# Patient Record
Sex: MALE | Race: BLACK OR AFRICAN AMERICAN | NOT HISPANIC OR LATINO | Employment: FULL TIME | URBAN - METROPOLITAN AREA
[De-identification: names, ages, dates, MRNs, and addresses within clinical notes are randomized per-mention and may not be internally consistent; named-entity substitution may affect disease eponyms.]

---

## 2018-09-02 ENCOUNTER — HOSPITAL ENCOUNTER (EMERGENCY)
Facility: HOSPITAL | Age: 53
Discharge: HOME | End: 2018-09-02
Attending: EMERGENCY MEDICINE
Payer: COMMERCIAL

## 2018-09-02 VITALS
DIASTOLIC BLOOD PRESSURE: 90 MMHG | RESPIRATION RATE: 16 BRPM | TEMPERATURE: 98 F | SYSTOLIC BLOOD PRESSURE: 156 MMHG | OXYGEN SATURATION: 96 % | WEIGHT: 191.13 LBS | HEART RATE: 59 BPM

## 2018-09-02 DIAGNOSIS — M25.562 LEFT KNEE PAIN: Primary | ICD-10-CM

## 2018-09-02 DIAGNOSIS — M25.562 ARTHRALGIA OF LEFT KNEE: ICD-10-CM

## 2018-09-02 DIAGNOSIS — R03.0 ELEVATED BLOOD-PRESSURE READING WITHOUT DIAGNOSIS OF HYPERTENSION: ICD-10-CM

## 2018-09-02 PROCEDURE — 99283 EMERGENCY DEPT VISIT LOW MDM: CPT | Mod: 25

## 2018-09-02 RX ORDER — NAPROXEN 500 MG/1
500 TABLET ORAL 2 TIMES DAILY WITH MEALS
Qty: 12 TABLET | Refills: 0 | Status: SHIPPED | OUTPATIENT
Start: 2018-09-02

## 2018-09-02 NOTE — ED PROVIDER NOTES
History      Chief Complaint   Patient presents with    Knee Pain     left knee popping, swelling, painful x 2 weeks; denies recent trauma       Review of patient's allergies indicates:  No Known Allergies     HPI   HPI    9/2/2018, 3:23 PM   History obtained from the patient      History of Present Illness: Rashaad Park Jr. is a 53 y.o. male patient who presents to the Emergency Department for left knee pain for 2 weeks, denies injury.   able to weight bear.  Denies fever.  Symptoms are moderate in severity.     No further complaints or concerns at this time.     Blood pressure is elevated.  Pt denies cp, sob, ha, dizziness, vision change.          PCP: [facility]       Past Medical History:  History reviewed. No pertinent past medical history.      Past Surgical History:  No past surgical history on file.        Family History:  History reviewed. No pertinent family history.        Social History:  Social History     Tobacco Use    Smoking status: Not on file   Substance and Sexual Activity    Alcohol use: Not on file    Drug use: Not on file    Sexual activity: Not on file       ROS     Review of Systems   Constitutional: Negative for chills and fever.   HENT: Negative for facial swelling and sore throat.    Eyes: Negative for pain and visual disturbance.   Respiratory: Negative for chest tightness and shortness of breath.    Cardiovascular: Negative for chest pain and palpitations.   Gastrointestinal: Negative for abdominal distention, abdominal pain and nausea.   Endocrine: Negative for cold intolerance and heat intolerance.   Genitourinary: Negative for dysuria and hematuria.   Musculoskeletal: Positive for arthralgias and joint swelling. Negative for back pain and neck stiffness.   Skin: Negative for color change, pallor and rash.   Neurological: Negative for syncope and weakness.   Hematological: Negative for adenopathy. Does not bruise/bleed easily.   All other systems reviewed and are  negative.      Physical Exam      Initial Vitals [09/02/18 1404]   BP Pulse Resp Temp SpO2   (!) 156/90 (!) 59 16 97.8 °F (36.6 °C) 96 %      MAP       --         Physical Exam  Vital signs and nursing notes reviewed.  Constitutional: Patient is in NAD. Awake and alert. Well-developed and well-nourished.  Head: Atraumatic. Normocephalic.  Eyes: PERRL. EOM intact. Conjunctivae nl. No scleral icterus.  ENT: Mucous membranes are moist. Oropharynx is clear.  Neck: Supple. No JVD. No lymphadenopathy.  No meningismus  Cardiovascular: Regular rate and rhythm. No murmurs, rubs, or gallops. Distal pulses are 2+ and symmetric.  Pulmonary/Chest: No respiratory distress. Clear to auscultation bilaterally. No wheezing, rales, or rhonchi.  Abdominal: Soft. Non-distended. No TTP. No rebound, guarding, or rigidity. Good bowel sounds.  Genitourinary: No CVA tenderness  Musculoskeletal: Moves all extremities.  Left knee with no erythema, or heat. +Mild edema. FROM.  No laxity.  Medial tenderness.   2+DP pulses.  Hip and ankle with from and nontender.  Skin: Warm and dry.  Neurological: Awake and alert. No acute focal neurological deficits are appreciated.  Psychiatric: Normal affect. Good eye contact. Appropriate in content.      ED Course          Procedures  ED Vital Signs:  Vitals:    09/02/18 1404   BP: (!) 156/90   Pulse: (!) 59   Resp: 16   Temp: 97.8 °F (36.6 °C)   TempSrc: Oral   SpO2: 96%   Weight: 86.7 kg (191 lb 2.2 oz)                 Imaging Results:  Imaging Results          X-Ray Knee Complete 4 or more Views Left (Final result)  Result time 09/02/18 15:04:33   Procedure changed from X-Ray Knee 3 View Left     Final result by Lino Pond MD (09/02/18 15:04:33)                 Impression:      Negative exam.      Electronically signed by: Lino Pond MD  Date:    09/02/2018  Time:    15:04             Narrative:    EXAMINATION:  XR KNEE COMP 4 OR MORE VIEWS LEFT    CLINICAL HISTORY:  Pain in left  kneepain;    TECHNIQUE:  Standard radiography performed.    COMPARISON:  None    FINDINGS:  Bone density and architecture are normal.  No acute findings.                                   The Emergency Provider reviewed the vital signs and test results, which are outlined above.    ED Discussion             Medication(s) given in the ER:  Medications - No data to display        Follow-up Information     Provider [facility] In 2 days.                     New Prescriptions    NAPROXEN (NAPROSYN) 500 MG TABLET    Take 1 tablet (500 mg total) by mouth 2 (two) times daily with meals. Prn pain          Medical Decision Making      Pre-hypertension/Hypertension: The pt has been informed that they may have pre-hypertension or hypertension based on a blood pressure reading in the ED. I recommend that the pt call the PCP listed on their discharge instructions or a physician of their choice this week to arrange f/u for further evaluation of possible pre-hypertension or hypertension.       All findings were reviewed with the patient/family in detail.   All remaining questions and concerns were addressed at that time.  Patient/family has been counseled regarding the need for follow-up as well as the indication to return to the emergency room should new or worrisome developments occur.        MDM               Clinical Impression:        ICD-10-CM ICD-9-CM   1. Left knee pain M25.562 719.46   2. Arthralgia of left knee M25.562 719.46   3. Elevated blood-pressure reading without diagnosis of hypertension R03.0 796.2             Emma Hendrickson PA-C  09/02/18 1525

## 2024-09-24 ENCOUNTER — TELEPHONE (OUTPATIENT)
Dept: PHYSICAL MEDICINE AND REHAB | Facility: CLINIC | Age: 59
End: 2024-09-24
Payer: MEDICARE

## 2024-09-24 ENCOUNTER — OFFICE VISIT (OUTPATIENT)
Dept: PAIN MEDICINE | Facility: CLINIC | Age: 59
End: 2024-09-24
Payer: MEDICARE

## 2024-09-24 VITALS
RESPIRATION RATE: 17 BRPM | SYSTOLIC BLOOD PRESSURE: 126 MMHG | WEIGHT: 218.25 LBS | HEART RATE: 68 BPM | HEIGHT: 73 IN | DIASTOLIC BLOOD PRESSURE: 85 MMHG | BODY MASS INDEX: 28.93 KG/M2

## 2024-09-24 DIAGNOSIS — M53.3 SACROILIAC JOINT PAIN: ICD-10-CM

## 2024-09-24 DIAGNOSIS — M54.16 LUMBAR RADICULOPATHY: Primary | ICD-10-CM

## 2024-09-24 DIAGNOSIS — Z96.642 HISTORY OF TOTAL HIP REPLACEMENT, LEFT: ICD-10-CM

## 2024-09-24 DIAGNOSIS — M54.16 LUMBAR RADICULOPATHY, CHRONIC: ICD-10-CM

## 2024-09-24 PROCEDURE — 99204 OFFICE O/P NEW MOD 45 MIN: CPT | Mod: S$GLB,,, | Performed by: ANESTHESIOLOGY

## 2024-09-24 PROCEDURE — 3008F BODY MASS INDEX DOCD: CPT | Mod: CPTII,S$GLB,, | Performed by: ANESTHESIOLOGY

## 2024-09-24 PROCEDURE — 3074F SYST BP LT 130 MM HG: CPT | Mod: CPTII,S$GLB,, | Performed by: ANESTHESIOLOGY

## 2024-09-24 PROCEDURE — 1159F MED LIST DOCD IN RCRD: CPT | Mod: CPTII,S$GLB,, | Performed by: ANESTHESIOLOGY

## 2024-09-24 PROCEDURE — 1160F RVW MEDS BY RX/DR IN RCRD: CPT | Mod: CPTII,S$GLB,, | Performed by: ANESTHESIOLOGY

## 2024-09-24 PROCEDURE — 99999 PR PBB SHADOW E&M-NEW PATIENT-LVL IV: CPT | Mod: PBBFAC,,, | Performed by: ANESTHESIOLOGY

## 2024-09-24 PROCEDURE — 3079F DIAST BP 80-89 MM HG: CPT | Mod: CPTII,S$GLB,, | Performed by: ANESTHESIOLOGY

## 2024-09-24 RX ORDER — SILDENAFIL 100 MG/1
TABLET, FILM COATED ORAL
COMMUNITY

## 2024-09-24 RX ORDER — PREGABALIN 100 MG/1
CAPSULE ORAL
Qty: 120 CAPSULE | Refills: 0 | Status: SHIPPED | OUTPATIENT
Start: 2024-09-24 | End: 2024-10-24

## 2024-09-24 RX ORDER — ZOLPIDEM TARTRATE 10 MG/1
10 TABLET ORAL NIGHTLY PRN
COMMUNITY

## 2024-09-24 RX ORDER — TIZANIDINE 4 MG/1
4 TABLET ORAL
COMMUNITY
Start: 2024-05-06

## 2024-09-24 RX ORDER — TADALAFIL 20 MG/1
20 TABLET ORAL DAILY PRN
COMMUNITY

## 2024-09-24 RX ORDER — ACETAMINOPHEN 500 MG
500 TABLET ORAL EVERY 6 HOURS PRN
COMMUNITY

## 2024-09-24 NOTE — PROGRESS NOTES
New Patient Chronic Pain Note (Initial Visit)    Referring Physician: Self, Aaareferral    PCP: No, Primary Doctor    Chief Complaint:   Chief Complaint   Patient presents with    Low-back Pain    Leg Pain     RIGHT         SUBJECTIVE:    Rashaad Park Jr. is a 59 y.o. male with past medical history significant for prostate cancer, erectile dysfunction, history of left total hip arthroplasty who presents to the clinic for the evaluation of lower back and leg pain.  Patient reports pain has been present for several years likely related to an 24 years prior when he was lifting up a heavy manhole lid and twisted his back.  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  Today he reports pain which is constant which is rated an 8/10.  Of note patient history of a left total hip arthroplasty in 2018 with Dr. Turner at [facility].  He reports pain which is constant and described as sharp in nature.  He reports pain in a bandlike distribution in the lower back which radiates into the groin and periodically down the right upper extremity in L 2-4 distribution to the knee.  Pain is exacerbated with moving from sitting to standing and ambulation or standing exceeding 5 minutes.  He does report a proximally 3-4 falls recently secondary to his pain.  He denies any left-sided symptoms.  Pain is marginally improved with massage.  Of note patient has received prior intervention with Dr. Bianchi [facility] which his family member believes were epidural steroid injections which gave him no significant relief.  Patient reports they had discussed considering neurosurgery with an anterior approach which she did not want to consider.  Patient has performed conventional land-based physical therapy in the past and has been performing physician directed physical therapy exercises for lower back and leg pain over the last 8 weeks from 07/24/2024 through 09/24/2024 with no  "significant improvement in his symptoms.  He has trialed gabapentin with significant GI discomfort.  He is currently taking Percocet 5 mg, 2 tablets with ineffective improvement in his pain.  He does report significant GI distress with most medications frequently requiring Phenergan."    Patient reports significant motor weakness.  Patient denies night fever/night sweats, urinary incontinence, bowel incontinence, significant weight loss, and loss of sensations.      Pain Disability Index Review:         9/24/2024     1:30 PM   Last 3 PDI Scores   Pain Disability Index (PDI) 49       Non-Pharmacologic Treatments:  Physical Therapy/Home Exercise: yes  Ice/Heat:yes  TENS: no  Acupuncture: no  Massage: no  Chiropractic: no    Other: no      Pain Medications:  - Adjuvant Medications: Alleve (Naproxen)    Pain Procedures:   Dr. Bianchi    History reviewed. No pertinent past medical history.  History reviewed. No pertinent surgical history.  Review of patient's allergies indicates:  No Known Allergies    Current Outpatient Medications   Medication Sig    acetaminophen (TYLENOL) 500 MG tablet Take 500 mg by mouth every 6 (six) hours as needed.    naproxen (NAPROSYN) 500 MG tablet Take 1 tablet (500 mg total) by mouth 2 (two) times daily with meals. Prn pain    sildenafiL (VIAGRA) 100 MG tablet 1 tablet as needed Orally Once a day for 30 day(s)    tadalafiL (CIALIS) 20 MG Tab Take 20 mg by mouth daily as needed.    tiZANidine (ZANAFLEX) 4 MG tablet Take 4 mg by mouth.    zolpidem (AMBIEN) 10 mg Tab Take 10 mg by mouth nightly as needed.    pregabalin (LYRICA) 100 MG capsule Take 1 capsule (100 mg total) by mouth 2 (two) times daily for 10 days, THEN 2 capsules (200 mg total) 2 (two) times daily for 10 days, THEN 3 capsules (300 mg total) 2 (two) times daily for 10 days.     No current facility-administered medications for this visit.         ROS:  GENERAL:  No weight loss, malaise or fevers.  HEENT:   No recent changes in " "vision or hearing  NECK:  Negative for lumps, no difficulty with swallowing.  RESPIRATORY:  Negative for cough, wheezing or shortness of breath, patient denies any recent URI.  CARDIOVASCULAR:  Negative for chest pain or palpitations.  GI:  Negative for abdominal discomfort, blood in stools or black stools or change in bowel habits.  MUSCULOSKELETAL:  See HPI.  SKIN:  Negative for lesions, rash, and itching.  PSYCH:  No mood disorder or recent psychosocial stressors.   HEMATOLOGY/LYMPHOLOGY:  Negative for prolonged bleeding, bruising easily or swollen nodes.    NEURO:   No history of syncope, paralysis, seizures or tremors.  All other reviewed and negative other than HPI.    OBJECTIVE:    /85   Pulse 68   Resp 17   Ht 6' 1" (1.854 m)   Wt 99 kg (218 lb 4.1 oz)   BMI 28.80 kg/m²       Physical Exam:    GENERAL: Well appearing, in no acute distress, alert and oriented x3.  PSYCH:  Mood and affect appropriate.  SKIN: Skin color, texture, turgor normal, no rashes or lesions.  HEAD/FACE:  Normocephalic, atraumatic. Cranial nerves grossly intact.    CV: RRR with palpation of the radial artery.  PULM: No evidence of respiratory difficulty, symmetric chest rise.  GI:  Soft and non-tender.    BACK: Straight leg raising in the sitting and supine positions is negative to radicular pain.  pain to palpation over the facet joints of the lumbar spine or spinous processes. Normal range of motion without pain reproduction.  EXTREMITIES: Peripheral joint ROM is full and pain free without obvious instability or laxity in all four extremities. No deformities, edema, or skin discoloration. Good capillary refill.  MUSCULOSKELETAL: Able to stand on heels & toes.   Shoulder, hip, and knee provocative maneuvers are negative.    SIJ testing: right  - TTP over SI joint: Present  - Allyson's/ Ash's: Positive    - Sacroiliac Distraction Test (anterior pressure): Positive  - Sacroiliac Compression Test (lateral pressure): Positive "   - SacralThrust Test (posterior pressure): Positive     Facet loading test is positive bilaterally.   Bilateral upper and lower extremity strength is normal and symmetric.  No atrophy or tone abnormalities are noted.    RIGHT Lower extremity: Hip flexion 5/5, Hip Abduction 5/5, Hip Adduction 5/5, Knee extension 5/5, Knee flexion 5/5, Ankle dorsiflexion5/5, Extensor hallucis longus 5/5, Ankle plantarflexion 5/5  LEFT Lower extremity:  Hip flexion 5/5, Hip Abduction 5/5,Hip Adduction 5/5, Knee extension 5/5, Knee flexion 5/5, Ankle dorsiflexion 5/5, Extensor hallucis longus 5/5, Ankle plantarflexion 5/5  -Normal testing knee (patellar) jerk and ankle (achilles) jerk    NEURO: Bilateral upper and lower extremity coordination and muscle stretch reflexes are physiologic and symmetric. No loss of sensation is noted.  GAIT:  Antalgic.  Ambulates with assistive device, cane    Imaging:   X-ray bilateral hips 05/26/2020  FINDINGS:   3 views of the left hip and AP view of the pelvis.     Left hip prosthesis is present. No evidence of periprosthetic lucency. No acute fracture.       ASSESSMENT: 59 y.o. year old male with     1. Lumbar radiculopathy  EMG W/ ULTRASOUND AND NERVE CONDUCTION TEST 2 Extremities      2. Lumbar radiculopathy, chronic  MRI Lumbar Spine Without Contrast      3. Sacroiliac joint pain        4. History of total hip replacement, left            PLAN:   - Interventions:  We have discussed considering right-sided sacroiliac joint injection to address sacroiliitis versus right-sided transforaminal epidural steroid injection to address lumbar radiculopathy.  We will 1st review updated lumbar MRI and electromyography studies. Explained the risks and benefits of the procedure in detail with the patient today in clinic along with alternative treatment options    - Anticoagulation use: No no anticoagulation     report:  Reviewed and consistent with medication use as prescribed.    - Medications:  -I will  start the patient on a Lyrica titration to see if this helps with neuropathic pain.  We discussed increasing the dose gradually to reach a therapeutic goal according to the following algorithm.  We discussed potential side effects of this medication which may include drowsiness,dizziness, dry mouth, constipation or peripheral edema.    -Week 1: Take 1 capsule, 100 mg b.i.d.  -Week 2: Take 2 capsules, 200 mg b.i.d.  -Week 3: Take 3 capsules, 300 mg b.i.d.    - Therapy:   We discussed continuing at home physical therapy to help manage the patient/s painful condition. The patient was counseled that muscle strengthening will improve the long term prognosis in regards to pain and may also help increase range of motion and mobility.     - Imaging: Reviewed available imaging (x-ray bilateral hips) with patient and answered any questions they had regarding study.  MRI lumbar spine to better evaluate pain and weakness    - Consults/Referrals:  -PMNR:  Lower extremity electromyography studies to help guide diagnosis treatment    - Records: Obtain old records from outside physicians and imaging    - Follow up visit: return to clinic in 4-6 weeks      The above plan and management options were discussed at length with patient. Patient is in agreement with the above and verbalized understanding.    - I discussed the goals of interventional chronic pain management with the patient on today's visit. We discussed a multimodal and systematic approach to pain.  This includes diagnostic and therapeutic injections, adjuvant pharmacologic treatment, physical therapy, and at times psychiatry.  I emphasized the importance of regular exercise, core strengthening and stretching, diet and weight loss as a cornerstone of long-term pain management.    - This condition does not require this patient to take time off of work, and the primary goal of our Pain Management services is to improve the patient's functional capacity.  - Patient  Questions: Answered all of the patient's questions regarding diagnoses, therapy, treatment and next steps        Johnny Keller MD  Interventional Pain Management  [facility]    Disclaimer:  This note was prepared using voice recognition system and is likely to have sound alike errors that may have been overlooked even after proof reading.  Please call me with any questions

## 2024-09-24 NOTE — TELEPHONE ENCOUNTER
----- Message from Everton Fung MA sent at 9/24/2024  2:02 PM CDT -----  Regarding: EMG  Good Morning,    Can you schedule this patient EMG please.     Thank you.  Everton Fung   Medical Assistant

## 2024-09-27 ENCOUNTER — TELEPHONE (OUTPATIENT)
Dept: PAIN MEDICINE | Facility: CLINIC | Age: 59
End: 2024-09-27
Payer: MEDICARE

## 2024-09-27 NOTE — TELEPHONE ENCOUNTER
----- Message from Vivian Oscar PA-C sent at 9/27/2024  9:11 AM CDT -----  Contact: PT Rashaad [phone]  Dr. Keller saw him 3 days ago and started Lyrica titration. He can go up to the next step if he would like. It will take several weeks for the medication to build up into the system and take effect. So take consistently.  ----- Message -----  From: Bella Ambriz  Sent: 9/27/2024   8:20 AM CDT  To: [facility] Staff    PT states that the medication the doctor gave him is not working. He said that he was informed to call the office if the medication did not work. Please call to advise.

## 2024-09-27 NOTE — TELEPHONE ENCOUNTER
Spoke with patient this morning and informed him to start week 2 of Lyrica titration.  Informed patient to start Lyrica 200 mg twice daily foir the nest week then take 300 mg twice a week.  Patient verbalized understanding and knows it will take a few weeks for the medication to get in his system to feel any results.

## 2024-09-30 ENCOUNTER — HOSPITAL ENCOUNTER (OUTPATIENT)
Dept: RADIOLOGY | Facility: HOSPITAL | Age: 59
Discharge: HOME | End: 2024-09-30
Attending: ANESTHESIOLOGY
Payer: MEDICARE

## 2024-09-30 DIAGNOSIS — M54.16 LUMBAR RADICULOPATHY, CHRONIC: ICD-10-CM

## 2024-09-30 PROCEDURE — 72148 MRI LUMBAR SPINE W/O DYE: CPT | Mod: TC

## 2024-09-30 PROCEDURE — 72148 MRI LUMBAR SPINE W/O DYE: CPT | Mod: 26,,, | Performed by: RADIOLOGY

## 2024-10-04 ENCOUNTER — TELEPHONE (OUTPATIENT)
Dept: PHYSICAL MEDICINE AND REHAB | Facility: CLINIC | Age: 59
End: 2024-10-04
Payer: MEDICARE

## 2024-10-11 NOTE — PROGRESS NOTES
Established Patient - TeleHealth Visit    The patient location is: [place]  The chief complaint leading to consultation is: chronic pain     Visit type: telemedicine visit -- connected through viavoo with audio & visual capabilities    Face to Face time with patient: 10-15 minutes  20 minutes of total time spent on the encounter, which includes face to face time and non-face to face time preparing to see the patient (eg, review of tests), Obtaining and/or reviewing separately obtained history, Documenting clinical information in the electronic or other health record, Independently interpreting results (not separately reported) and communicating results to the patient/family/caregiver, or Care coordination (not separately reported).     Each patient to whom he or she provides medical services by telemedicine is:  (1) informed of the relationship between the physician and patient and the respective role of any other health care provider with respect to management of the patient; and (2) notified that he or she may decline to receive medical services by telemedicine and may withdraw from such care at any time.    ______________________________________________________________________      Referring Physician: self     PCP: No, Primary Doctor      Chief Complaint:   Chief Complaint   Patient presents with    Follow-up     Low back pain with RLE radicular pain   Right sided SIJ pain        SUBJECTIVE:    Interval History (10/14/2024):  Patient presents today for follow-up visit.  Patient was last seen on 9/24/2024. At that visit, the plan was to start Lyrica, which he reports is not helping.  Patient reports pain as 7/10 today.    Initial HPI (9/24/2024):  Rashaad Park Jr. is a 59 y.o. male with past medical history significant for prostate cancer, erectile dysfunction, history of left total hip arthroplasty who presents to the clinic for the evaluation of lower back and leg pain.  Patient reports pain has been  "present for several years likely related to an 24 years prior when he was lifting up a heavy manhole lid and twisted his back.  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  Today he reports pain which is constant which is rated an 8/10.  Of note patient history of a left total hip arthroplasty in 2018 with Dr. Turner at [facility].  He reports pain which is constant and described as sharp in nature.  He reports pain in a bandlike distribution in the lower back which radiates into the groin and periodically down the right upper extremity in L 2-4 distribution to the knee.  Pain is exacerbated with moving from sitting to standing and ambulation or standing exceeding 5 minutes.  He does report a proximally 3-4 falls recently secondary to his pain.  He denies any left-sided symptoms.  Pain is marginally improved with massage.  Of note patient has received prior intervention with [facility]. [facility]chi [facility] which his family member believes were epidural steroid injections which gave him no significant relief.  Patient reports they had discussed considering neurosurgery with an anterior approach which she did not want to consider.  Patient has performed conventional land-based physical therapy in the past and has been performing physician directed physical therapy exercises for lower back and leg pain over the last 8 weeks from 07/24/2024 through 09/24/2024 with no significant improvement in his symptoms.  He has trialed gabapentin with significant GI discomfort.  He is currently taking Percocet 5 mg, 2 tablets with ineffective improvement in his pain.  He does report significant GI distress with most medications frequently requiring Phenergan."    Patient reports significant motor weakness.  Patient denies night fever/night sweats, urinary incontinence, bowel incontinence, significant weight loss, and loss of sensations.      Pain Disability Index (PDI) " "Score Review:      9/24/2024     1:30 PM   Last 3 PDI Scores   Pain Disability Index (PDI) 49         Non-Pharmacologic Treatments:  Physical Therapy/Home Exercise: yes  Ice/Heat:yes  TENS: no  Acupuncture: no  Massage: no  Chiropractic: no    Other: no      Pain Medications:  - Adjuvant Medications: Alleve (Naproxen)      Pain Procedures:   Dr. Bianchi   -unsure of type -- did not help    Dr. Keller:      Review of Systems:   GENERAL:  No weight loss, malaise or fevers.  HEENT:   No recent changes in vision or hearing  NECK:  Negative for lumps, no difficulty with swallowing.  RESPIRATORY:  Negative for cough, wheezing or shortness of breath, patient denies any recent URI.  CARDIOVASCULAR:  Negative for chest pain or palpitations.  GI:  Negative for abdominal discomfort, blood in stools or black stools or change in bowel habits.  MUSCULOSKELETAL:  See HPI.  SKIN:  Negative for lesions, rash, and itching.  PSYCH:  No mood disorder or recent psychosocial stressors.   HEMATOLOGY/LYMPHOLOGY:  Negative for prolonged bleeding, bruising easily or swollen nodes.    NEURO:   No history of syncope, paralysis, seizures or tremors.  All other reviewed and negative other than HPI.        OBJECTIVE:    Telemedicine Physical Exam:   Vitals:    10/14/24 0845   Height: 6' 1" (1.854 m)  Comment: Patient reported     Body mass index is 28.8 kg/m².   (reviewed on 10/14/2024)     GENERAL: Well appearing, in no acute distress, alert and oriented x3.  Cooperative.  PSYCH:  Mood and affect appropriate.  SKIN: Skin color & texture with no obvious abnormalities.    HEAD/FACE:  Normocephalic, atraumatic.    PULM:  No difficulty breathing. No nasal flaring. No obvious wheezing.  EXTREMITIES: No obvious deformities. Moving all extremities well, appears to have symmetric strength throughout.  MUSCULOSKELETAL: No obvious atrophy abnormalities are noted.   NEURO: No obvious neurologic deficit.   GAIT: sitting.     Physical Exam: last in clinic " visit:  GENERAL: Well appearing, in no acute distress, alert and oriented x3.  PSYCH:  Mood and affect appropriate.  SKIN: Skin color, texture, turgor normal, no rashes or lesions.  HEAD/FACE:  Normocephalic, atraumatic. Cranial nerves grossly intact.    CV: RRR with palpation of the radial artery.  PULM: No evidence of respiratory difficulty, symmetric chest rise.  GI:  Soft and non-tender.    BACK: Straight leg raising in the sitting and supine positions is negative to radicular pain.  pain to palpation over the facet joints of the lumbar spine or spinous processes. Normal range of motion without pain reproduction.  EXTREMITIES: Peripheral joint ROM is full and pain free without obvious instability or laxity in all four extremities. No deformities, edema, or skin discoloration. Good capillary refill.  MUSCULOSKELETAL: Able to stand on heels & toes.   Shoulder, hip, and knee provocative maneuvers are negative.    SIJ testing: right  - TTP over SI joint: Present  - Allyson's/ Ash's: Positive    - Sacroiliac Distraction Test (anterior pressure): Positive  - Sacroiliac Compression Test (lateral pressure): Positive   - SacralThrust Test (posterior pressure): Positive     Facet loading test is positive bilaterally.   Bilateral upper and lower extremity strength is normal and symmetric.  No atrophy or tone abnormalities are noted.    RIGHT Lower extremity: Hip flexion 5/5, Hip Abduction 5/5, Hip Adduction 5/5, Knee extension 5/5, Knee flexion 5/5, Ankle dorsiflexion5/5, Extensor hallucis longus 5/5, Ankle plantarflexion 5/5  LEFT Lower extremity:  Hip flexion 5/5, Hip Abduction 5/5,Hip Adduction 5/5, Knee extension 5/5, Knee flexion 5/5, Ankle dorsiflexion 5/5, Extensor hallucis longus 5/5, Ankle plantarflexion 5/5  -Normal testing knee (patellar) jerk and ankle (achilles) jerk    NEURO: Bilateral upper and lower extremity coordination and muscle stretch reflexes are physiologic and symmetric. No loss of sensation is  noted.  GAIT:  Antalgic.  Ambulates with assistive device, cane        Imaging/ Diagnostic Studies/ Labs (Reviewed on 10/14/2024):    9/30/2024 MRI Lumbar Spine Without Contrast  COMPARISON:  None.    FINDINGS:  Lumbar spine alignment is within normal limits. The vertebral body heights are well maintained, with no fracture.  No marrow signal abnormality suspicious for an infiltrative process.    The conus medullaris terminates at approximately the superior endplate of L2.  There is a small probable cyst seen involving the inferior pole of the left kidney that measures approximately 6 mm in size.  There is disc desiccation noted at the L4-5 and L5-S1 levels with mild disc space narrowing seen at the L4-5 and L5-S1 levels.    L1-L2: No significant central canal or neural foraminal narrowing.    L2-L3: No significant central canal or neural foraminal narrowing.    L3-L4: No significant central canal or neural foraminal narrowing.    L4-L5:  There is a diffuse disc bulge with associated hyperintensity within this portion of the disc most consistent with annular tearing.  There is a small superior disc extrusion in the left paracentral region as well that measures up to approximately 7 mm in the craniocaudal dimension.  No significant central canal narrowing. No significant neural foraminal canal narrowing.  Moderate bilateral facet arthropathy with bilateral facet joint effusions noted.    L5-S1:  Mild diffuse disc bulge with a superimposed small superior extrusion component with extrusion component measuring approximately 7-8 mm in the craniocaudal dimension.  Hyperintensity noted within this portion of the disc most consistent with annular tearing.  No significant central or neural foraminal canal narrowing.  Moderate bilateral facet arthropathy noted.  No significant neural foraminal canal narrowing.  Impression:   1. Degenerative changes of the lower lumbar spine as detailed above.  No high-grade central or neural  foraminal canal narrowing.  2. Remaining findings as discussed above.         X-ray bilateral hips 05/26/2020  FINDINGS:   3 views of the left hip and AP view of the pelvis.   Left hip prosthesis is present. No evidence of periprosthetic lucency. No acute fracture.         ASSESSMENT: 59 y.o. year old male with     1. Arthropathy of right sacroiliac joint  Case Request-RAD/Other Procedure Area: Right SIJ + Right GT bursa injection      2. Right lumbar radiculopathy  pregabalin (LYRICA) 300 MG Cap      3. Sacroiliac joint pain  celecoxib (CELEBREX) 100 MG capsule    acetaminophen (TYLENOL) 500 MG tablet      4. History of total hip replacement, left        5. Greater trochanteric bursitis, right  Case Request-RAD/Other Procedure Area: Right SIJ + Right GT bursa injection      6. Lumbar radiculopathy, chronic              PLAN:   - Interventions:   -Schedule right SI joint injection. Patient is not taking prescription blood thinners or ASA.      - Consider lumbar HEYDI based on EMG/NCS results.     - Anticoagulation use: No no anticoagulation      - Medications:  -Refill Lyrica (pregabalin) 300mg BID to see if this helps with neuropathic pain. We discussed potential side effects of this medication which may include drowsiness,dizziness, dry mouth, constipation or peripheral edema.    - Start Celebrex 100-200mg BID PRN to see if this helps with arthritic pain.  Take with food.  Avoid other OTC NSAIDs (ex. Ibuprofen, naproxen) while taking this medication. We have previously discussed potential deleterious side effects of NSAIDs on the cardiovascular, gastrointestinal and renal systems. We have previously discussed judicious use of this medication.       - Try OTC Tylenol (acetaminophen) 500mg x 2 tablets (1000mg) TID PRN, not to exceed 3000mg per day.    - [place]  reviewed and appropriate.      - Therapy:   We discussed continuing at home physical therapy to help manage the patient/s painful condition. The patient was  counseled that muscle strengthening will improve the long term prognosis in regards to pain and may also help increase range of motion and mobility.     - Imaging:  MRI lumbar (9/2024) to better evaluate pain and weakness - reviewed.     - Consults/Referrals:  -PMNR:  Lower extremity electromyography studies to help guide diagnosis treatment -- scheduled on 10/16/24.    - Records: Obtain old records from outside physicians and imaging; will send 2nd request.       - Follow up visit: 4 weeks post-procedure - virtual visit       Future Appointments   Date Time Provider Department Center   10/16/2024 12:20 PM Radha Zacarias MD [facility]       - Patient Questions: Answered all of the patient's questions regarding diagnosis, therapy, and treatment.    - This condition does not require this patient to take time off of work, and the primary goal of our Pain Management services is to improve the patient's functional capacity.   - I discussed the risks, benefits, and alternatives to potential treatment options. All questions and concerns were fully addressed today in clinic.         Vivian Oscar PA-C  [facility] - [facility]    Disclaimer:  This note was prepared using voice recognition system and is likely to have sound alike errors that may have been overlooked even after proof reading.  Please call me with any questions.

## 2024-10-14 ENCOUNTER — OFFICE VISIT (OUTPATIENT)
Dept: PAIN MEDICINE | Facility: CLINIC | Age: 59
End: 2024-10-14
Payer: MEDICARE

## 2024-10-14 VITALS — HEIGHT: 73 IN | BODY MASS INDEX: 28.8 KG/M2

## 2024-10-14 DIAGNOSIS — M70.61 GREATER TROCHANTERIC BURSITIS, RIGHT: ICD-10-CM

## 2024-10-14 DIAGNOSIS — M54.16 RIGHT LUMBAR RADICULOPATHY: ICD-10-CM

## 2024-10-14 DIAGNOSIS — M54.16 LUMBAR RADICULOPATHY, CHRONIC: ICD-10-CM

## 2024-10-14 DIAGNOSIS — M47.818 ARTHROPATHY OF RIGHT SACROILIAC JOINT: Primary | ICD-10-CM

## 2024-10-14 DIAGNOSIS — Z96.642 HISTORY OF TOTAL HIP REPLACEMENT, LEFT: ICD-10-CM

## 2024-10-14 DIAGNOSIS — M53.3 SACROILIAC JOINT PAIN: ICD-10-CM

## 2024-10-14 PROCEDURE — 1160F RVW MEDS BY RX/DR IN RCRD: CPT | Mod: CPTII,95,, | Performed by: PHYSICIAN ASSISTANT

## 2024-10-14 PROCEDURE — 99214 OFFICE O/P EST MOD 30 MIN: CPT | Mod: 95,,, | Performed by: PHYSICIAN ASSISTANT

## 2024-10-14 PROCEDURE — 3008F BODY MASS INDEX DOCD: CPT | Mod: CPTII,95,, | Performed by: PHYSICIAN ASSISTANT

## 2024-10-14 PROCEDURE — 1159F MED LIST DOCD IN RCRD: CPT | Mod: CPTII,95,, | Performed by: PHYSICIAN ASSISTANT

## 2024-10-14 RX ORDER — CELECOXIB 100 MG/1
100-200 CAPSULE ORAL 2 TIMES DAILY PRN
Qty: 90 CAPSULE | Refills: 1 | Status: SHIPPED | OUTPATIENT
Start: 2024-10-14

## 2024-10-14 RX ORDER — ACETAMINOPHEN 500 MG
1000 TABLET ORAL EVERY 8 HOURS PRN
Start: 2024-10-14

## 2024-10-14 RX ORDER — PREGABALIN 300 MG/1
300 CAPSULE ORAL 2 TIMES DAILY
Qty: 60 CAPSULE | Refills: 1 | Status: SHIPPED | OUTPATIENT
Start: 2024-10-14

## 2024-10-14 NOTE — PATIENT INSTRUCTIONS
Sacroiliitis   Sacroiliitis ([id number]-[id number]-I-[id number]) is a painful condition that affects one or both sacroiliac joints. These joints sit where the lower spine and pelvis meet. Sacroiliitis can cause pain and stiffness in the buttocks or lower back, and the pain might go down one or both legs. Standing or sitting for a long time or climbing stairs can make the pain worse.  Sacroiliitis can be hard to diagnose. It can be mistaken for other causes of low back pain. It's been linked to a group of diseases that cause inflammatory arthritis of the spine. Treatment might involve physical therapy and medicines.      The pain of sacroiliitis most often occurs in the buttocks and lower back. It also can affect the legs, groin and even the feet. The pain can improve with movement. The following can make sacroiliitis pain worse:  Sleeping or sitting for a long time.  Standing a long time.  Having more weight on one leg than the other.  Stair climbing.  Running.  Taking large steps when moving forward.      Causes for sacroiliac joint issues include:  Most commonly --Arthritis. Wear-and-tear arthritis, also known as osteoarthritis, can occur in sacroiliac joints. So can a type of arthritis that affects the spine, known as ankylosing spondylitis.  Injury. A sudden impact, such as a motor vehicle accident or a fall, can damage the sacroiliac joints.    ______________________________________________________        ________________________________________________________      Here are some brief explanations of conditions on MRI:    When the disc bulge is large enough and herniates out toward the spinal canal, it puts pressure on the sensitive spinal nerves, causing pain - including lumbar radiculopathy/ sciatica, a sharp, often shooting pain that extends from the buttocks down the back of one leg [or cervical radiculopathy when shooting from the neck into the arm]. Numbness, tingling, and weakness in one extremity is also common.  If  the herniated disc is not pressing on a nerve, the patient may experience a neck or back ache or even no pain at all.                      Facet joint syndrome is an arthritis-like condition of the spine that can be a significant source of back and neck pain. It is caused by degenerative changes to the joints between the spine bones. The cartilage inside the facet joint can break down and become inflamed, triggering pain signals in nearby nerve endings.        Annular tear:    Symptoms  Annular tears can cause pain in the lower back, neck, or legs, as well as tingling, numbness, or a burning sensation. Pain can worsen with physical activity, prolonged sitting or standing, coughing, sneezing, or bending forward. Other symptoms include muscle weakness, reduced flexibility, and difficulty standing up straight.   Causes  Annular tears are most often caused by the natural aging process, but can also be caused by spinal trauma.   Treatments  Treatments for annular tears include:   Anti-inflammatory medication   Steroid injections   Physical therapy   Heat or ice therapy   Weight loss   Regular exercise   Adopting proper lifting techniques   Using safety equipment

## 2024-10-16 ENCOUNTER — TELEPHONE (OUTPATIENT)
Dept: PAIN MEDICINE | Facility: CLINIC | Age: 59
End: 2024-10-16
Payer: MEDICARE

## 2024-10-16 ENCOUNTER — OFFICE VISIT (OUTPATIENT)
Dept: PHYSICAL MEDICINE AND REHAB | Facility: CLINIC | Age: 59
End: 2024-10-16
Payer: MEDICARE

## 2024-10-16 VITALS — BODY MASS INDEX: 28.93 KG/M2 | HEIGHT: 73 IN | WEIGHT: 218.25 LBS | RESPIRATION RATE: 13 BRPM

## 2024-10-16 DIAGNOSIS — M54.16 LUMBAR RADICULOPATHY: ICD-10-CM

## 2024-10-16 PROCEDURE — 95908 NRV CNDJ TST 3-4 STUDIES: CPT | Mod: S$GLB,,, | Performed by: PHYSICAL MEDICINE & REHABILITATION

## 2024-10-16 PROCEDURE — 99999 PR PBB SHADOW E&M-EST. PATIENT-LVL III: CPT | Mod: PBBFAC,,, | Performed by: PHYSICAL MEDICINE & REHABILITATION

## 2024-10-16 PROCEDURE — 95885 MUSC TST DONE W/NERV TST LIM: CPT | Mod: S$GLB,,, | Performed by: PHYSICAL MEDICINE & REHABILITATION

## 2024-10-16 PROCEDURE — 99499 UNLISTED E&M SERVICE: CPT | Mod: S$GLB,,, | Performed by: PHYSICAL MEDICINE & REHABILITATION

## 2024-10-16 NOTE — TELEPHONE ENCOUNTER
Called patient to schedule injection with Dr Keller, no answer unable to leave .    .[facility]

## 2024-10-16 NOTE — TELEPHONE ENCOUNTER
----- Message from Vivian Oscar PA-C sent at 10/14/2024  9:35 AM CDT -----  Pain Provider: Krishna    Case: right SIJ + right GT bursa injection     Anticoagulation: none      4 weeks post-procedure   Virtual visit  [facility]   Vivian Oscar PA-C

## 2024-10-16 NOTE — PROGRESS NOTES
[facility]  Department of Physiatry-EMG  [facility]   [address] [place] [address]         Full Name: Rashaad Park YOB: 1965  Patient ID: [id number]      Visit Date: 10/16/2024 13:47  Age: 59 Years  Referring Physician:   Conclusion: lower extr    Chief Complaint   Patient presents with    Back Pain    Leg Pain         HPI: This is a 59 y.o.  male being seen in clinic today for evaluation of chronic low back achy pain with radiation into his right leg to the knee.  His symptoms can occur at rest or with activity.  Meds provide some relief.    History obtained from patient    Past family, medical, social, and surgical history reviewed in chart    Review of Systems:     General- denies lethargy, weight change, fever, chills  Head/neck- denies swallowing difficulties  ENT- denies hearing changes  Cardiovascular-denies chest pain  Pulmonary- denies shortness of breath  GI- denies constipation or bowel incontinence  - denies bladder incontinence  Skin- denies wounds or rashes  Musculoskeletal- denies weakness, + pain  Neurologic- + numbness and tingling  Psychiatric- denies depressive or psychotic features, denies anxiety  Lymphatic-denies swelling  Endocrine- denies hypoglycemic symptoms/DM history  All other pertinent systems negative     Physical Examination:  General: Well developed, well nourished male, NAD  HEENT:NCAT EOMI bilaterally   Pulmonary:Normal respirations    Spinal Examination: CERVICAL  Active ROM is within normal limits.  Inspection: No deformity of spinal alignment.    Spinal Examination: LUMBAR or THORACIC  Active ROM is limited at endranges  Inspection: No deformity of spinal alignment.    Slr neg     Bilateral Upper and Lower Extremities:  Pulses are 2+ at radial bilaterally.  Shoulder/Elbow/Wrist/Hand ROM   Hip/Knee/Ankle ROM wnl  Bilateral Extremities show normal capillary refill.  No signs of cyanosis, rubor, edema, skin  changes, or dysvascular changes of appendages.  Nails appear intact.    Neurological Exam:  Cranial Nerves:  II-XII grossly intact    Manual Muscle Testing: (Motor 5=normal)  5/5 strength bilateral lower extremities    No focal atrophy is noted of either lower extremity.    Bilateral Reflexes:  No clonus at knee or ankle.    Sensation: tested to light touch  - intact in legs     Gait: Narrow base and good arm swing.      Entire procedure explained to patient prior to proceeding.  Verbal consent obtained      Sensory NCS      Nerve / Sites Rec. Site Onset Lat Peak Lat NP Amp PP Amp Segments Distance Velocity     ms ms µV µV  mm m/s   R Sural - Ankle (Calf)      Calf Ankle 2.81 3.65 16.0 9.8 Calf - Ankle 140 50   R Superficial peroneal - Ankle      Lat leg Ankle 1.63 2.56 8.3 10.8 Lat leg - Ankle 140 86           Motor NCS      Nerve / Sites Muscle Latency Amplitude Amp % Duration Segments Distance Lat Diff Velocity     ms mV % ms  mm ms m/s   R Peroneal - EDB      Ankle EDB 4.15 5.5 100 5.25 Ankle - EDB 80        Fib head EDB 11.29 5.5 99.1 6.58 Fib head - Ankle 360 7.15 50      Pop fossa EDB 13.00 5.5 100 5.21 Pop fossa - Fib head 90 1.71 53   R Tibial - AH      Ankle AH 5.56 5.1 100 3.13 Ankle - AH 80        Pop fossa AH 15.65 4.8 93.7 4.69 Pop fossa - Ankle 450 10.08 45           EMG Summary Table     Spontaneous MUAP Recruitment   Muscle Nerve Roots IA Fib PSW Fasc H.F. Amp Dur. PPP Pattern   R. Rectus femoris Femoral L2-L4 N None None None None N N N N   R. Extensor digitorum brevis Tibial L5-S1 1+ 1+ None None None N N 1+ Reduced   R. Abductor hallucis Tibial S1-S2 1+ None 1+ None None N 1+ 1+ Reduced         INTERPRETATION  -Right superficial peroneal sensory nerve conduction study showed normal peak latency and amplitude  -Right sural sensory nerve conduction study showed normal peak latency and amplitude  -Right peroneal motor nerve conduction study showed normal latency, amplitude, and conduction  velocity  -Right tibial motor nerve conduction study showed normal latency, amplitude, and conduction velocity  -Needle EMG examination performed to above mentioned muscles       IMPRESSION  ABNORMAL Study  2. There is electrodiagnostic evidence of an acute on chronic radiculopathy of the right L5, S1 nerve roots    PLAN  Discussed in detail for greater than 30 minutes about diagnosis and treatment plan    1. Follow up with referring provider: Dr. Johnny Keller  2. Handouts on lumbar radic, exercise provided  3. This study is good for one year. If symptoms worsen or do not improve, please re-consult.    Radha Zacarias M.D.  Physical Medicine and Rehab       114

## 2025-01-31 ENCOUNTER — TELEPHONE (OUTPATIENT)
Dept: PAIN MEDICINE | Facility: CLINIC | Age: 60
End: 2025-01-31
Payer: MEDICARE

## 2025-01-31 NOTE — TELEPHONE ENCOUNTER
I reached out to the patient and go him scheduled for his injection and his follow up appointment. I discuss procedure instructions as well.    [facility]

## 2025-01-31 NOTE — TELEPHONE ENCOUNTER
Tried calling the patient but no answer and no voicemail is setup yet.    Avani HERNDON ([facility])

## 2025-02-14 ENCOUNTER — HOSPITAL ENCOUNTER (OUTPATIENT)
Facility: HOSPITAL | Age: 60
Discharge: HOME | End: 2025-02-14
Attending: ANESTHESIOLOGY | Admitting: ANESTHESIOLOGY
Payer: MEDICARE

## 2025-02-14 VITALS
HEART RATE: 82 BPM | RESPIRATION RATE: 15 BRPM | WEIGHT: 222.31 LBS | HEIGHT: 73 IN | TEMPERATURE: 98 F | DIASTOLIC BLOOD PRESSURE: 90 MMHG | SYSTOLIC BLOOD PRESSURE: 129 MMHG | BODY MASS INDEX: 29.46 KG/M2 | OXYGEN SATURATION: 96 %

## 2025-02-14 DIAGNOSIS — M46.1 SACROILIITIS: ICD-10-CM

## 2025-02-14 PROBLEM — M47.818 ARTHROPATHY OF RIGHT SACROILIAC JOINT: Status: ACTIVE | Noted: 2025-02-14

## 2025-02-14 PROBLEM — M70.61 GREATER TROCHANTERIC BURSITIS, RIGHT: Status: ACTIVE | Noted: 2025-02-14

## 2025-02-14 LAB — POCT GLUCOSE: 126 MG/DL (ref 70–110)

## 2025-02-14 PROCEDURE — 25500020 PHARM REV CODE 255: Performed by: ANESTHESIOLOGY

## 2025-02-14 PROCEDURE — 27096 INJECT SACROILIAC JOINT: CPT | Mod: RT | Performed by: ANESTHESIOLOGY

## 2025-02-14 PROCEDURE — 20610 DRAIN/INJ JOINT/BURSA W/O US: CPT | Mod: 59,RT | Performed by: ANESTHESIOLOGY

## 2025-02-14 PROCEDURE — 25000003 PHARM REV CODE 250: Performed by: ANESTHESIOLOGY

## 2025-02-14 PROCEDURE — 63600175 PHARM REV CODE 636 W HCPCS: Performed by: ANESTHESIOLOGY

## 2025-02-14 RX ORDER — INDOMETHACIN 25 MG/1
CAPSULE ORAL
Status: DISCONTINUED | OUTPATIENT
Start: 2025-02-14 | End: 2025-02-14 | Stop reason: HOSPADM

## 2025-02-14 RX ORDER — BUPIVACAINE HYDROCHLORIDE 2.5 MG/ML
INJECTION, SOLUTION EPIDURAL; INFILTRATION; INTRACAUDAL
Status: DISCONTINUED | OUTPATIENT
Start: 2025-02-14 | End: 2025-02-14 | Stop reason: HOSPADM

## 2025-02-14 RX ORDER — TRIAMCINOLONE ACETONIDE 40 MG/ML
INJECTION, SUSPENSION INTRA-ARTICULAR; INTRAMUSCULAR
Status: DISCONTINUED | OUTPATIENT
Start: 2025-02-14 | End: 2025-02-14 | Stop reason: HOSPADM

## 2025-02-14 NOTE — DISCHARGE INSTRUCTIONS

## 2025-02-14 NOTE — DISCHARGE SUMMARY
Discharge Note  Short Stay      SUMMARY     Admit Date: 2/14/2025    Attending Physician: Johnny Keller MD        Discharge Physician: Johnny Keller MD        Discharge Date: 2/14/2025 9:46 AM    Procedure(s) (LRB):  Right SIJ + Right GT bursa injection (Right)    Final Diagnosis: Arthropathy of right sacroiliac joint [M47.818]  Greater trochanteric bursitis, right [M70.61]    Disposition: Home or self care    Patient Instructions:   Current Discharge Medication List        CONTINUE these medications which have NOT CHANGED    Details   pregabalin (LYRICA) 300 MG Cap Take 1 capsule (300 mg total) by mouth 2 (two) times daily.  Qty: 60 capsule, Refills: 1    Associated Diagnoses: Right lumbar radiculopathy      zolpidem (AMBIEN) 10 mg Tab Take 10 mg by mouth nightly as needed.      !! acetaminophen (TYLENOL) 500 MG tablet Take 500 mg by mouth every 6 (six) hours as needed.      !! acetaminophen (TYLENOL) 500 MG tablet Take 2 tablets (1,000 mg total) by mouth every 8 (eight) hours as needed for Pain. Not actual Rx - Use for Recommended dose instructions    Associated Diagnoses: Sacroiliac joint pain      celecoxib (CELEBREX) 100 MG capsule Take 1-2 capsules (100-200 mg total) by mouth 2 (two) times daily as needed for Pain. Take with food.  Avoid other OTC NSAIDs (ex. Ibuprofen, naproxen) while taking this medication.  Qty: 90 capsule, Refills: 1    Associated Diagnoses: Sacroiliac joint pain      naproxen (NAPROSYN) 500 MG tablet Take 1 tablet (500 mg total) by mouth 2 (two) times daily with meals. Prn pain  Qty: 12 tablet, Refills: 0      sildenafiL (VIAGRA) 100 MG tablet 1 tablet as needed Orally Once a day for 30 day(s)      tadalafiL (CIALIS) 20 MG Tab Take 20 mg by mouth daily as needed.      tiZANidine (ZANAFLEX) 4 MG tablet Take 4 mg by mouth.       !! - Potential duplicate medications found. Please discuss with provider.              Discharge Diagnosis: Arthropathy of right sacroiliac joint [M47.818]  Greater  trochanteric bursitis, right [M70.61]  Condition on Discharge: Stable with no complications to procedure   Diet on Discharge: Same as before.  Activity: as per instruction sheet.  Discharge to: Home with a responsible adult.  Follow up: 2-4 weeks       Please call the office at [phone] if you experience any weakness or loss of sensation, fever > 101.5, pain uncontrolled with oral medications, persistent nausea/vomiting/or diarrhea, redness or drainage from the incisions, or any other worrisome concerns. If physician on call was not reached or could not communicate with our office for any reason please go to the nearest emergency department

## 2025-02-14 NOTE — H&P
HPI  Patient presenting for Procedure(s) (LRB):  Right SIJ + Right GT bursa injection (Right)     Patient on Anti-coagulation No    No health changes since previous encounter    No past medical history on file.  No past surgical history on file.  Review of patient's allergies indicates:  No Known Allergies     No current facility-administered medications on file prior to encounter.     Current Outpatient Medications on File Prior to Encounter   Medication Sig Dispense Refill    acetaminophen (TYLENOL) 500 MG tablet Take 500 mg by mouth every 6 (six) hours as needed.      acetaminophen (TYLENOL) 500 MG tablet Take 2 tablets (1,000 mg total) by mouth every 8 (eight) hours as needed for Pain. Not actual Rx - Use for Recommended dose instructions      celecoxib (CELEBREX) 100 MG capsule Take 1-2 capsules (100-200 mg total) by mouth 2 (two) times daily as needed for Pain. Take with food.  Avoid other OTC NSAIDs (ex. Ibuprofen, naproxen) while taking this medication. 90 capsule 1    naproxen (NAPROSYN) 500 MG tablet Take 1 tablet (500 mg total) by mouth 2 (two) times daily with meals. Prn pain 12 tablet 0    pregabalin (LYRICA) 300 MG Cap Take 1 capsule (300 mg total) by mouth 2 (two) times daily. 60 capsule 1    sildenafiL (VIAGRA) 100 MG tablet 1 tablet as needed Orally Once a day for 30 day(s)      tadalafiL (CIALIS) 20 MG Tab Take 20 mg by mouth daily as needed.      tiZANidine (ZANAFLEX) 4 MG tablet Take 4 mg by mouth.      zolpidem (AMBIEN) 10 mg Tab Take 10 mg by mouth nightly as needed.          PMHx, PSHx, Allergies, Medications reviewed in epic    ROS negative except pain complaints in HPI    OBJECTIVE:    There were no vitals taken for this visit.    PHYSICAL EXAMINATION:    GENERAL: Well appearing, in no acute distress, alert and oriented x3.  PSYCH:  Mood and affect appropriate.  SKIN: Skin color, texture, turgor normal, no rashes or lesions which will impact the procedure.  CV: RRR with palpation of the  radial artery.  PULM: No evidence of respiratory difficulty, symmetric chest rise. Clear to auscultation.  NEURO: Cranial nerves grossly intact.    Plan:    Proceed with procedure as planned Procedure(s) (LRB):  Right SIJ + Right GT bursa injection (Right)    Johnny Keller MD  02/14/2025

## 2025-02-14 NOTE — OP NOTE
Rashaad Park Jr.  60 y.o. male      Vitals:    02/14/25 0908   BP: (!) 140/89   Pulse: 80   Resp: 16   Temp: 97.5 °F (36.4 °C)       Procedure Date: 02/14/2025        INFORMED CONSENT: The procedure, risks, benefits and options were discussed with patient. There are no contraindications to the procedure. The patient expressed understanding and agreed to proceed. The personnel performing the procedure was discussed. I verify that I personally obtained consent prior to the start of the procedure and the signed consent can be found on the patient's chart.       Anesthesia:   Local provided by M.D    The patient was monitored with continuous pulse oximetry, EKG, and intermittent blood pressure monitors.  The patient was hemodynamically stable throughout the entire process was responsive to voice, and breathing spontaneously.  Supplemental O2 was provided at 2L/min via nasal cannula.  Patient was comfortable for the duration of the procedure. (See nurse documentation and case log for sedation time)        Pre Procedure diagnosis: Arthropathy of right sacroiliac joint [M47.818]  Greater trochanteric bursitis, right [M70.61]  Post-Procedure diagnosis: SAME      PROCEDURE:  1) Right greater trochanteric bursa injection    2) Right sacroiliac joint injection                            REASON FOR PROCEDURE:   Sacroiliitis [M46.1]  Greater trochanteric bursitis[M70.61]      MEDICATIONS INJECTED: 1mL 40mg/ml Kenalog and 4mL Bupivacaine 0.25% into each site    LOCAL ANESTHETIC USED: Xylocaine 1% 6ml     ESTIMATED BLOOD LOSS: None.   COMPLICATIONS: None.     TECHNIQUE:   Greater trochanteric bursa injection:  The area overlying the greater trochanteric bursa was identified using fluoroscopy, and the area overlying the skin was prepped and draped in usual sterile fashion. Local Xylocaine was injected by raising a wheel and going down to the periosteum using a 27-gauge hypodermic needle. A 5 inch 22-gauge spinal needle was  introduce into the Right greater trochanteric bursa. Negative pressure applied to confirm no intravascular placement. Omnipaque was injected to confirm placement and to confirm that there was no vascular runoff. The medication was then injected slowly.  Displacement of the contrast after injection of the medication confirmed that the medication went into the area of the greater trochanteric bursa    Sacroiliac joint injection:   Laying in the prone position, the patient was prepped and draped in the usual sterile fashion using ChloraPrep and fenestrated drape.  The area was determined under fluoroscopy.  Local Xylocaine was injected by raising a wheel and going down to the periosteum using a 27-gauge hypodermic needle.  The 3.5 inch 22-gauge spinal needle was introduce into the Right sacroiliac joint.  Negative pressure applied to confirm no intravascular placement.  Omnipaque was injected to confirm placement and to confirm that there was no vascular runoff.  The medication was then injected slowly.  The patient tolerated the procedure well.                       The patient was monitored for approximately 30 minutes after the procedure. Patient was given post procedure and discharge instructions to follow at home. We will see the patient back in two weeks or the patient may call to inform of status. The patient was discharged in a stable condition

## 2025-02-19 ENCOUNTER — TELEPHONE (OUTPATIENT)
Dept: PAIN MEDICINE | Facility: CLINIC | Age: 60
End: 2025-02-19
Payer: MEDICARE

## 2025-02-19 NOTE — TELEPHONE ENCOUNTER
----- Message from Milana sent at 2/19/2025 10:02 AM CST -----  Regarding: Medical Advice  Contact: Rashaad  Type:  Needs Medical AdviceWho Called: Rashaad Symptoms (please be specific):  back and hip pain How long has patient had these symptoms:  Pharmacy name and phone #:  [facility] #[id number] -  AT [place] [id number] [facility] [id number]: [phone] Fax: [phone] Would the patient rather a call back or a response via [facility]? [facility] Call Back Number: [phone] Information:  Rashaad says the injection worked for 2 days and he is in pain again and need a new prescription.

## 2025-02-19 NOTE — TELEPHONE ENCOUNTER
Reach out to pt to inform him that , unfortunately  it is common to have increase pain after receiving an injection. This can because due to the numbing medication wearing off. It may take up to 2-3 weeks for the injection to take full affect.   For your pain, continue taking your medication as prescribed, or take over the counter pain medication, if possible. Ice the pain site 20min on 20 min off and rest, if possible.  Generally  we do not prescribe any stronger or new medication as we will not know if it the the procedure working or the medication. Give it some time to subside and check back in with us if pain worsens. Pt understood and made it a 4 week follow up.

## 2025-03-10 ENCOUNTER — OFFICE VISIT (OUTPATIENT)
Dept: PAIN MEDICINE | Facility: CLINIC | Age: 60
End: 2025-03-10
Payer: MEDICARE

## 2025-03-10 VITALS
SYSTOLIC BLOOD PRESSURE: 120 MMHG | BODY MASS INDEX: 29.22 KG/M2 | DIASTOLIC BLOOD PRESSURE: 85 MMHG | HEIGHT: 73 IN | RESPIRATION RATE: 17 BRPM | WEIGHT: 220.44 LBS

## 2025-03-10 DIAGNOSIS — M53.3 SACROILIAC JOINT PAIN: ICD-10-CM

## 2025-03-10 DIAGNOSIS — M47.818 ARTHROPATHY OF RIGHT SACROILIAC JOINT: Primary | ICD-10-CM

## 2025-03-10 DIAGNOSIS — M70.61 GREATER TROCHANTERIC BURSITIS, RIGHT: ICD-10-CM

## 2025-03-10 DIAGNOSIS — M79.18 LUMBAR MUSCLE PAIN: ICD-10-CM

## 2025-03-10 DIAGNOSIS — M54.16 RIGHT LUMBAR RADICULOPATHY: ICD-10-CM

## 2025-03-10 DIAGNOSIS — Z96.642 HISTORY OF TOTAL HIP REPLACEMENT, LEFT: ICD-10-CM

## 2025-03-10 PROCEDURE — 3008F BODY MASS INDEX DOCD: CPT | Mod: CPTII,S$GLB,, | Performed by: PHYSICIAN ASSISTANT

## 2025-03-10 PROCEDURE — 1160F RVW MEDS BY RX/DR IN RCRD: CPT | Mod: CPTII,S$GLB,, | Performed by: PHYSICIAN ASSISTANT

## 2025-03-10 PROCEDURE — 99999 PR PBB SHADOW E&M-EST. PATIENT-LVL III: CPT | Mod: PBBFAC,,, | Performed by: PHYSICIAN ASSISTANT

## 2025-03-10 PROCEDURE — 99214 OFFICE O/P EST MOD 30 MIN: CPT | Mod: S$GLB,,, | Performed by: PHYSICIAN ASSISTANT

## 2025-03-10 PROCEDURE — 3079F DIAST BP 80-89 MM HG: CPT | Mod: CPTII,S$GLB,, | Performed by: PHYSICIAN ASSISTANT

## 2025-03-10 PROCEDURE — 3074F SYST BP LT 130 MM HG: CPT | Mod: CPTII,S$GLB,, | Performed by: PHYSICIAN ASSISTANT

## 2025-03-10 PROCEDURE — 1159F MED LIST DOCD IN RCRD: CPT | Mod: CPTII,S$GLB,, | Performed by: PHYSICIAN ASSISTANT

## 2025-03-10 RX ORDER — TIZANIDINE 4 MG/1
2-4 TABLET ORAL 2 TIMES DAILY PRN
Qty: 60 TABLET | Refills: 0 | Status: SHIPPED | OUTPATIENT
Start: 2025-03-10

## 2025-03-10 RX ORDER — CELECOXIB 100 MG/1
100-200 CAPSULE ORAL 2 TIMES DAILY PRN
Qty: 90 CAPSULE | Refills: 1 | Status: SHIPPED | OUTPATIENT
Start: 2025-03-10 | End: 2025-03-12

## 2025-03-10 NOTE — PROGRESS NOTES
Chronic Pain -- Established Patient (Follow-up visit)    Referring Physician: self     PCP: No, Primary Doctor      Chief complaint:  Leg Pain (Right ) and Hip Pain (Right )     Low back pain with RLE radicular pain   Right sided SIJ pain        SUBJECTIVE:    Interval History (3/10/2025): Rashaad Park Jr. presents today for follow-up visit.  he underwent Right SIJ + Right GT bursa injection on 2/14/25.  The patient reports that he is/was better following the procedure.  he reports 80% pain relief x less than a week.     Patient reports pain as 8/10 today.  He did not get the Celebrex that was ordered after the last visit.  He continues to take Lyrica regularly.  His wife is with him today and states that her hydrocodone significantly helped his pain, which she takes for sickle cell anemia.    Interval History (10/14/2024):  Patient presents today for follow-up visit.  Patient was last seen on 9/24/2024. At that visit, the plan was to start Lyrica, which he reports is not helping.  Patient reports pain as 7/10 today.    Initial HPI (9/24/2024):  Rashaad Park Jr. is a 59 y.o. male with past medical history significant for prostate cancer, erectile dysfunction, history of left total hip arthroplasty who presents to the clinic for the evaluation of lower back and leg pain.  Patient reports pain has been present for several years likely related to an 24 years prior when he was lifting up a heavy manhole lid and twisted his back.  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  Today he reports pain which is constant which is rated an 8/10.  Of note patient history of a left total hip arthroplasty in 2018 with Dr. Turner at [facility].  He reports pain which is constant and described as sharp in nature.  He reports pain in a bandlike distribution in the lower back which radiates into the groin and periodically down the right upper extremity in L 2-4 distribution to the  "knee.  Pain is exacerbated with moving from sitting to standing and ambulation or standing exceeding 5 minutes.  He does report a proximally 3-4 falls recently secondary to his pain.  He denies any left-sided symptoms.  Pain is marginally improved with massage.  Of note patient has received prior intervention with [facility]. [facility] which his family member believes were epidural steroid injections which gave him no significant relief.  Patient reports they had discussed considering neurosurgery with an anterior approach which she did not want to consider.  Patient has performed conventional land-based physical therapy in the past and has been performing physician directed physical therapy exercises for lower back and leg pain over the last 8 weeks from 07/24/2024 through 09/24/2024 with no significant improvement in his symptoms.  He has trialed gabapentin with significant GI discomfort.  He is currently taking Percocet 5 mg, 2 tablets with ineffective improvement in his pain.  He does report significant GI distress with most medications frequently requiring Phenergan."    Patient reports significant motor weakness.  Patient denies night fever/night sweats, urinary incontinence, bowel incontinence, significant weight loss, and loss of sensations.      Pain Disability Index (PDI) Score Review:      3/10/2025    10:32 AM 9/24/2024     1:30 PM   Last 3 PDI Scores   Pain Disability Index (PDI) 48 49         Non-Pharmacologic Treatments:  Physical Therapy/Home Exercise: yes  Ice/Heat:yes  TENS: no  Acupuncture: no  Massage: no  Chiropractic: no    Other: no      Pain Medications:  - Adjuvant Medications: Alleve (Naproxen)      Pain Procedures:   Dr. Bianchi   -unsure of type -- did not help    Dr. Keller:  -02/14/2025:  Right SIJ + Right GT bursa injection       Review of Systems:   GENERAL:  No weight loss, malaise or fevers.  HEENT:   No recent changes in vision or hearing  NECK:  Negative for " "lumps, no difficulty with swallowing.  RESPIRATORY:  Negative for cough, wheezing or shortness of breath, patient denies any recent URI.  CARDIOVASCULAR:  Negative for chest pain or palpitations.  GI:  Negative for abdominal discomfort, blood in stools or black stools or change in bowel habits.  MUSCULOSKELETAL:  See HPI.  SKIN:  Negative for lesions, rash, and itching.  PSYCH:  No mood disorder or recent psychosocial stressors.   HEMATOLOGY/LYMPHOLOGY:  Negative for prolonged bleeding, bruising easily or swollen nodes.    NEURO:   No history of syncope, paralysis, seizures or tremors.  All other reviewed and negative other than HPI.        OBJECTIVE:    Physical Exam:  Vitals:    03/10/25 1033   BP: 120/85   Pulse: (P) 78   Resp: 17   Weight: 100 kg (220 lb 7.4 oz)   Height: 6' 1" (1.854 m)   PainSc:   8   PainLoc: Hip    Body mass index is 29.09 kg/m².   (reviewed on 3/10/2025)    GENERAL: Well appearing, in no acute distress, alert and oriented x3.  PSYCH:  Mood and affect appropriate.  SKIN: Skin color, texture, turgor normal, no rashes or lesions.  HEAD/FACE:  Normocephalic, atraumatic. Cranial nerves grossly intact.    PULM: No evidence of respiratory difficulty, symmetric chest rise.  GI:  Soft and non-tender.    BACK: Straight leg raising in the sitting and supine positions is negative to radicular pain.  pain to palpation over the facet joints of the lumbar spine or spinous processes. Normal range of motion without pain reproduction.  EXTREMITIES: Peripheral joint ROM is full and pain free without obvious instability or laxity in all four extremities. No deformities, edema, or skin discoloration. Good capillary refill.  MUSCULOSKELETAL: Able to stand on heels & toes.   Shoulder, hip, and knee provocative maneuvers are negative.    SIJ testing: right  - TTP over SI joint: Present  - Allyson's/ Ash's: Positive    - Sacroiliac Distraction Test (anterior pressure): Positive  - Sacroiliac Compression Test " (lateral pressure): Positive      Facet loading test is positive bilaterally.   Bilateral upper and lower extremity strength is normal and symmetric.  No atrophy or tone abnormalities are noted.    RIGHT Lower extremity: Hip flexion 5/5, Hip Abduction 5/5, Hip Adduction 5/5, Knee extension 5/5, Knee flexion 5/5, Ankle dorsiflexion5/5, Extensor hallucis longus 5/5, Ankle plantarflexion 5/5  LEFT Lower extremity:  Hip flexion 5/5, Hip Abduction 5/5,Hip Adduction 5/5, Knee extension 5/5, Knee flexion 5/5, Ankle dorsiflexion 5/5, Extensor hallucis longus 5/5, Ankle plantarflexion 5/5  -Normal testing knee (patellar) jerk and ankle (achilles) jerk    NEURO: Bilateral upper and lower extremity coordination and muscle stretch reflexes are physiologic and symmetric. No loss of sensation is noted.  GAIT:  Antalgic.  Ambulates with assistive device, cane        Imaging/ Diagnostic Studies/ Labs (Reviewed on 3/10/2025):      10/16/2024 BLE EMG/NCS   ABNORMAL Study  2. There is electrodiagnostic evidence of an acute on chronic radiculopathy of the right L5, S1 nerve roots    9/30/2024 MRI Lumbar Spine Without Contrast  COMPARISON: None.    FINDINGS:  Lumbar spine alignment is within normal limits. The vertebral body heights are well maintained, with no fracture.  No marrow signal abnormality suspicious for an infiltrative process.    The conus medullaris terminates at approximately the superior endplate of L2.  There is a small probable cyst seen involving the inferior pole of the left kidney that measures approximately 6 mm in size.  There is disc desiccation noted at the L4-5 and L5-S1 levels with mild disc space narrowing seen at the L4-5 and L5-S1 levels.    L1-L2: No significant central canal or neural foraminal narrowing.    L2-L3: No significant central canal or neural foraminal narrowing.    L3-L4: No significant central canal or neural foraminal narrowing.    L4-L5:  There is a diffuse disc bulge with associated  hyperintensity within this portion of the disc most consistent with annular tearing.  There is a small superior disc extrusion in the left paracentral region as well that measures up to approximately 7 mm in the craniocaudal dimension.  No significant central canal narrowing. No significant neural foraminal canal narrowing.  Moderate bilateral facet arthropathy with bilateral facet joint effusions noted.    L5-S1:  Mild diffuse disc bulge with a superimposed small superior extrusion component with extrusion component measuring approximately 7-8 mm in the craniocaudal dimension.  Hyperintensity noted within this portion of the disc most consistent with annular tearing.  No significant central or neural foraminal canal narrowing.  Moderate bilateral facet arthropathy noted.  No significant neural foraminal canal narrowing.  Impression:   1. Degenerative changes of the lower lumbar spine as detailed above.  No high-grade central or neural foraminal canal narrowing.  2. Remaining findings as discussed above.         X-ray bilateral hips 05/26/2020  FINDINGS:   3 views of the left hip and AP view of the pelvis.   Left hip prosthesis is present. No evidence of periprosthetic lucency. No acute fracture.         ASSESSMENT: 60 y.o. year old male with     1. Arthropathy of right sacroiliac joint        2. Greater trochanteric bursitis, right        3. History of total hip replacement, left        4. Right lumbar radiculopathy  Case Request-RAD/Other Procedure Area: right L5/S1 + S1 TF HEYDI      5. Sacroiliac joint pain  celecoxib (CELEBREX) 100 MG capsule      6. Lumbar muscle pain  tiZANidine (ZANAFLEX) 4 MG tablet          PLAN:   - Interventions:   - S/p Right SIJ + Right GT bursa injection on 2/14/25 with 80% pain relief for less than a week.    - Would recommend: right L5/S1 + S1 TF HEYDI. Patient is not taking prescription blood thinners or ASA.  He wants to hold off.    - Anticoagulation use: No no anticoagulation      -  Medications:  -Refill Lyrica (pregabalin) 300mg BID for neuropathic pain. We discussed potential side effects of this medication which may include drowsiness,dizziness, dry mouth, constipation or peripheral edema.    - ReStart Celebrex 100-200mg BID PRN to see if this helps with arthritic pain.  Take with food.  Avoid other OTC NSAIDs (ex. Ibuprofen, naproxen) while taking this medication. We have previously discussed potential deleterious side effects of NSAIDs on the cardiovascular, gastrointestinal and renal systems. We have previously discussed judicious use of this medication.       - Start Zanaflex 4mg BID PRN muscle spasms (or can take 1/2 tablet). Patient understands this may cause drowsiness.    - Try OTC Tylenol (acetaminophen) 500mg x 2 tablets (1000mg) TID PRN, not to exceed 3000mg per day.    - Discussed with patient that we   - LA  reviewed and appropriate.      - Therapy:   We discussed continuing at home physical therapy to help manage the patient/s painful condition. The patient was counseled that muscle strengthening will improve the long term prognosis in regards to pain and may also help increase range of motion and mobility.     Diagnostic/ Imaging: No new imaging ordered. Previous imaging reviewed: MRI lumbar (9/2024).  BLE EMG/NCS 10/16/24 reviewed.         - Records: Previously attempted to obtain old records from outside physicians: Dr. Dimitry Bianchi & Dr. Moe Turner -- nothing received.     - Follow up visit: 4-6 weeks follow-up (discuss HEYDI if needed) - in clinic (per pt request)       Future Appointments   Date Time Provider Department Center   4/23/2025 11:20 AM Vivian Oscar PA-C [facility] [place]         - Patient Questions: Answered all of the patient's questions regarding diagnosis, therapy, and treatment.    - This condition does not require this patient to take time off of work, and the primary goal of our Pain Management services is to improve the patient's  functional capacity.   - I discussed the risks, benefits, and alternatives to potential treatment options. All questions and concerns were fully addressed today in clinic.         Vivian Oscar PA-C  Interventional Pain Management - [facility]    Disclaimer:  This note was prepared using voice recognition system and is likely to have sound alike errors that may have been overlooked even after proof reading.  Please call me with any questions.

## 2025-03-12 ENCOUNTER — TELEPHONE (OUTPATIENT)
Dept: PAIN MEDICINE | Facility: CLINIC | Age: 60
End: 2025-03-12
Payer: MEDICARE

## 2025-03-12 RX ORDER — CELECOXIB 100 MG/1
100 CAPSULE ORAL 2 TIMES DAILY PRN
Qty: 60 CAPSULE | Refills: 1 | Status: SHIPPED | OUTPATIENT
Start: 2025-03-12

## 2025-03-12 NOTE — TELEPHONE ENCOUNTER
Spoke with pt's wife, informed her that the script was re sent to pharmacy with differenct directions.

## 2025-03-12 NOTE — TELEPHONE ENCOUNTER
----- Message from Vivian Oscar PA-C sent at 3/12/2025 10:37 AM CDT -----  Resent with different instructions. Up to BID PRN  ----- Message -----  From: Jose Luther  Sent: 3/12/2025   8:47 AM CDT  To: [facility] Staff    .Type: Patient Call Back  Who called: Patient wife What is the request in detail:  Called in concerning celecoxib (CELEBREX) 100 MG capsule . Patient insurance wont pay for the medication being prescribed for 4 times a day but will cover it if the prescription is for 2 times a day . Patient is needing a new prescription sent in so insurance will cover it . Please call backCan the clinic reply by [facility]?     Would the patient rather a call back or a response via [facility]?  call Best call back number:

## 2025-04-22 ENCOUNTER — TELEPHONE (OUTPATIENT)
Dept: PAIN MEDICINE | Facility: CLINIC | Age: 60
End: 2025-04-22
Payer: MEDICARE

## 2025-04-23 ENCOUNTER — OFFICE VISIT (OUTPATIENT)
Dept: PAIN MEDICINE | Facility: CLINIC | Age: 60
End: 2025-04-23
Payer: MEDICARE

## 2025-04-23 VITALS
HEART RATE: 69 BPM | SYSTOLIC BLOOD PRESSURE: 117 MMHG | DIASTOLIC BLOOD PRESSURE: 78 MMHG | WEIGHT: 218.25 LBS | BODY MASS INDEX: 28.93 KG/M2 | HEIGHT: 73 IN | RESPIRATION RATE: 17 BRPM

## 2025-04-23 DIAGNOSIS — M70.61 GREATER TROCHANTERIC BURSITIS, RIGHT: ICD-10-CM

## 2025-04-23 DIAGNOSIS — M47.818 ARTHROPATHY OF RIGHT SACROILIAC JOINT: Primary | ICD-10-CM

## 2025-04-23 DIAGNOSIS — Z96.642 HISTORY OF TOTAL HIP REPLACEMENT, LEFT: ICD-10-CM

## 2025-04-23 DIAGNOSIS — M53.3 SACROILIAC JOINT PAIN: ICD-10-CM

## 2025-04-23 DIAGNOSIS — M54.16 RIGHT LUMBAR RADICULOPATHY: ICD-10-CM

## 2025-04-23 DIAGNOSIS — M79.18 LUMBAR MUSCLE PAIN: ICD-10-CM

## 2025-04-23 DIAGNOSIS — M54.50 LUMBOSACRAL PAIN: ICD-10-CM

## 2025-04-23 PROCEDURE — 99214 OFFICE O/P EST MOD 30 MIN: CPT | Mod: S$GLB,,, | Performed by: PHYSICIAN ASSISTANT

## 2025-04-23 PROCEDURE — 3078F DIAST BP <80 MM HG: CPT | Mod: CPTII,S$GLB,, | Performed by: PHYSICIAN ASSISTANT

## 2025-04-23 PROCEDURE — 3008F BODY MASS INDEX DOCD: CPT | Mod: CPTII,S$GLB,, | Performed by: PHYSICIAN ASSISTANT

## 2025-04-23 PROCEDURE — 99999 PR PBB SHADOW E&M-EST. PATIENT-LVL IV: CPT | Mod: PBBFAC,,, | Performed by: PHYSICIAN ASSISTANT

## 2025-04-23 PROCEDURE — 1159F MED LIST DOCD IN RCRD: CPT | Mod: CPTII,S$GLB,, | Performed by: PHYSICIAN ASSISTANT

## 2025-04-23 PROCEDURE — G2211 COMPLEX E/M VISIT ADD ON: HCPCS | Mod: S$GLB,,, | Performed by: PHYSICIAN ASSISTANT

## 2025-04-23 PROCEDURE — 1160F RVW MEDS BY RX/DR IN RCRD: CPT | Mod: CPTII,S$GLB,, | Performed by: PHYSICIAN ASSISTANT

## 2025-04-23 PROCEDURE — 3074F SYST BP LT 130 MM HG: CPT | Mod: CPTII,S$GLB,, | Performed by: PHYSICIAN ASSISTANT

## 2025-04-23 RX ORDER — LOPERAMIDE HCL 2 MG
2 TABLET ORAL 4 TIMES DAILY PRN
COMMUNITY

## 2025-04-23 RX ORDER — PANTOPRAZOLE SODIUM 40 MG/1
40 TABLET, DELAYED RELEASE ORAL
COMMUNITY

## 2025-04-23 RX ORDER — LOSARTAN POTASSIUM AND HYDROCHLOROTHIAZIDE 25; 100 MG/1; MG/1
1 TABLET ORAL
COMMUNITY
Start: 2025-04-22

## 2025-04-23 RX ORDER — ZOLPIDEM TARTRATE 10 MG/1
10 TABLET ORAL NIGHTLY
COMMUNITY
Start: 2024-07-31

## 2025-04-23 RX ORDER — GLIMEPIRIDE 2 MG/1
2 TABLET ORAL
COMMUNITY
Start: 2023-11-21 | End: 2025-07-14

## 2025-04-23 RX ORDER — PREGABALIN 200 MG/1
200 CAPSULE ORAL 2 TIMES DAILY
Qty: 60 CAPSULE | Refills: 2 | Status: SHIPPED | OUTPATIENT
Start: 2025-04-23

## 2025-04-23 RX ORDER — PREGABALIN 200 MG/1
200 CAPSULE ORAL 2 TIMES DAILY
Qty: 60 CAPSULE | Refills: 2 | Status: CANCELLED | OUTPATIENT
Start: 2025-04-23

## 2025-04-23 RX ORDER — GABAPENTIN 300 MG/1
300 CAPSULE ORAL
COMMUNITY
End: 2025-04-23 | Stop reason: ALTCHOICE

## 2025-04-23 RX ORDER — LEVOCETIRIZINE DIHYDROCHLORIDE 5 MG/1
1 TABLET, FILM COATED ORAL NIGHTLY
COMMUNITY
Start: 2024-12-31 | End: 2025-12-31

## 2025-04-23 RX ORDER — FAMOTIDINE 20 MG/1
20 TABLET, FILM COATED ORAL
COMMUNITY

## 2025-04-23 NOTE — PROGRESS NOTES
Chronic Pain -- Established Patient (Follow-up visit)    Referring Physician: self     PCP: Hailey Scott MD      Chief complaint:  Follow-up     Low back pain with RLE radicular pain, mostly thigh pain  Right sided SIJ pain        SUBJECTIVE:    Interval History (4/23/2025):  Patient presents for follow-up regarding back pain.  Patient was last seen on 3/10/2025. At that visit, the plan was to continue medications. Patient reports pain as 8/10 today.    He reports lower back and right thigh pain, describing it as shock-like with associated weakness. Pain does not radiate down the entire leg, remaining localized to the thigh area. He mentions knee popping -- although not interested in ordering imaging or treatment for this. Recently, his grandchild massaged his back, causing discomfort from the added weight.  He is very uninterested in trying a lumbar epidural for this pain.  His wife mentions her history of back surgery involving pins and screws in the vertebrae, which she describes as successful.     He states that the previously prescribed Pregabalin (Lyrica) has been ineffective. He has finished the medication and is not currently taking any pain medications.  He most recently had the 100 mg dosage.    He denies taking any current pain medications and has never tried marijuana. He denies any history of illicit drug use.  He works for the city.    Interval History (3/10/2025): Rashaad Park Jr. presents today for follow-up visit.  he underwent Right SIJ + Right GT bursa injection on 2/14/25.  The patient reports that he is/was better following the procedure.  he reports 80% pain relief x less than a week.     Patient reports pain as 8/10 today.  He did not get the Celebrex that was ordered after the last visit.  He continues to take Lyrica regularly.  His wife is with him today and states that her hydrocodone significantly helped his pain, which she takes for sickle cell anemia.    Interval History  (10/14/2024):  Patient presents today for follow-up visit.  Patient was last seen on 9/24/2024. At that visit, the plan was to start Lyrica, which he reports is not helping.  Patient reports pain as 7/10 today.    Initial HPI (9/24/2024):  Rashaad Park Jr. is a 59 y.o. male with past medical history significant for prostate cancer, erectile dysfunction, history of left total hip arthroplasty who presents to the clinic for the evaluation of lower back and leg pain.  Patient reports pain has been present for several years likely related to an 24 years prior when he was lifting up a heavy manhole lid and twisted his back.  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  Today he reports pain which is constant which is rated an 8/10.  Of note patient history of a left total hip arthroplasty in 2018 with Dr. Turner at [facility].  He reports pain which is constant and described as sharp in nature.  He reports pain in a bandlike distribution in the lower back which radiates into the groin and periodically down the right upper extremity in L 2-4 distribution to the knee.  Pain is exacerbated with moving from sitting to standing and ambulation or standing exceeding 5 minutes.  He does report a proximally 3-4 falls recently secondary to his pain.  He denies any left-sided symptoms.  Pain is marginally improved with massage.  Of note patient has received prior intervention with Dr. [facility] which his family member believes were epidural steroid injections which gave him no significant relief.  Patient reports they had discussed considering neurosurgery with an anterior approach which she did not want to consider.  Patient has performed conventional land-based physical therapy in the past and has been performing physician directed physical therapy exercises for lower back and leg pain over the last 8 weeks from 07/24/2024 through 09/24/2024 with no  "significant improvement in his symptoms.  He has trialed gabapentin with significant GI discomfort.  He is currently taking Percocet 5 mg, 2 tablets with ineffective improvement in his pain.  He does report significant GI distress with most medications frequently requiring Phenergan."    Patient reports significant motor weakness.  Patient denies night fever/night sweats, urinary incontinence, bowel incontinence, significant weight loss, and loss of sensations.      Pain Disability Index (PDI) Score Review:      4/23/2025    11:37 AM 3/10/2025    10:32 AM 9/24/2024     1:30 PM   Last 3 PDI Scores   Pain Disability Index (PDI) 56 48 49         Non-Pharmacologic Treatments:  Physical Therapy/Home Exercise: yes  Ice/Heat:yes  TENS: no  Acupuncture: no  Massage: no  Chiropractic: no    Other: no      Pain Medications:  - Adjuvant Medications: Alleve (Naproxen)      Pain Procedures:   Dr. Bianchi   -unsure of type -- did not help    Dr. Keller:  -02/14/2025:  Right SIJ + Right GT bursa injection       Review of Systems:   GENERAL:  No weight loss, malaise or fevers.  HEENT:   No recent changes in vision or hearing  NECK:  Negative for lumps, no difficulty with swallowing.  RESPIRATORY:  Negative for cough, wheezing or shortness of breath, patient denies any recent URI.  CARDIOVASCULAR:  Negative for chest pain or palpitations.  GI:  Negative for abdominal discomfort, blood in stools or black stools or change in bowel habits.  MUSCULOSKELETAL:  See HPI.  SKIN:  Negative for lesions, rash, and itching.  PSYCH:  No mood disorder or recent psychosocial stressors.   HEMATOLOGY/LYMPHOLOGY:  Negative for prolonged bleeding, bruising easily or swollen nodes.    NEURO:   No history of syncope, paralysis, seizures or tremors.  All other reviewed and negative other than HPI.        OBJECTIVE:    Physical Exam:  Vitals:    04/23/25 1136   BP: 117/78   Pulse: 69   Resp: 17   Weight: 99 kg (218 lb 4.1 oz)   Height: 6' 1" (1.854 m) "   PainSc:   8   PainLoc: Back   Body mass index is 28.8 kg/m².   (reviewed on 4/23/2025)    GENERAL: Well appearing, in no acute distress, alert and oriented x3.  PSYCH:  Mood and affect appropriate.  SKIN: Skin color, texture, turgor normal, no rashes or lesions.  HEAD/FACE:  Normocephalic, atraumatic. Cranial nerves grossly intact.    PULM: No evidence of respiratory difficulty, symmetric chest rise.    BACK: Straight leg raising in the sitting and supine positions is negative to radicular pain.  pain to palpation over the facet joints of the lumbar spine or spinous processes. Normal range of motion without pain reproduction.  EXTREMITIES: Peripheral joint ROM is full and pain free without obvious instability or laxity in all four extremities. No deformities, edema, or skin discoloration. Good capillary refill.  MUSCULOSKELETAL: Able to stand on heels & toes.   Shoulder, hip, and knee provocative maneuvers are negative.      SIJ testing: right  - TTP over SI joint: Present  - Allyson's/ Ash's: Positive    - Sacroiliac Distraction Test (anterior pressure): Positive  - Sacroiliac Compression Test (lateral pressure): Positive     Facet loading test is positive bilaterally, worse on the right.   Bilateral upper and lower extremity strength is normal and symmetric.  No atrophy or tone abnormalities are noted.    RIGHT Lower extremity: Hip flexion 5/5, Hip Abduction 5/5, Hip Adduction 5/5, Knee extension 5/5, Knee flexion 5/5, Ankle dorsiflexion5/5, Extensor hallucis longus 5/5, Ankle plantarflexion 5/5  LEFT Lower extremity:  Hip flexion 5/5, Hip Abduction 5/5,Hip Adduction 5/5, Knee extension 5/5, Knee flexion 5/5, Ankle dorsiflexion 5/5, Extensor hallucis longus 5/5, Ankle plantarflexion 5/5  -Normal testing knee (patellar) jerk and ankle (achilles) jerk    NEURO: BUE & BLE coordination and muscle stretch reflexes are physiologic and symmetric. No loss of sensation is noted.  GAIT:  Antalgic.  Ambulates with  assistive device, cane        Imaging/ Diagnostic Studies/ Labs (Reviewed on 4/23/2025):      10/16/2024 BLE EMG/NCS   ABNORMAL Study  2. There is electrodiagnostic evidence of an acute on chronic radiculopathy of the right L5, S1 nerve roots    9/30/2024 MRI Lumbar Spine Without Contrast  COMPARISON: None.    FINDINGS:  Lumbar spine alignment is within normal limits. The vertebral body heights are well maintained, with no fracture.  No marrow signal abnormality suspicious for an infiltrative process.    The conus medullaris terminates at approximately the superior endplate of L2.  There is a small probable cyst seen involving the inferior pole of the left kidney that measures approximately 6 mm in size.  There is disc desiccation noted at the L4-5 and L5-S1 levels with mild disc space narrowing seen at the L4-5 and L5-S1 levels.    L1-L2: No significant central canal or neural foraminal narrowing.    L2-L3: No significant central canal or neural foraminal narrowing.    L3-L4: No significant central canal or neural foraminal narrowing.    L4-L5:  There is a diffuse disc bulge with associated hyperintensity within this portion of the disc most consistent with annular tearing.  There is a small superior disc extrusion in the left paracentral region as well that measures up to approximately 7 mm in the craniocaudal dimension.  No significant central canal narrowing. No significant neural foraminal canal narrowing.  Moderate bilateral facet arthropathy with bilateral facet joint effusions noted.    L5-S1:  Mild diffuse disc bulge with a superimposed small superior extrusion component with extrusion component measuring approximately 7-8 mm in the craniocaudal dimension.  Hyperintensity noted within this portion of the disc most consistent with annular tearing.  No significant central or neural foraminal canal narrowing.  Moderate bilateral facet arthropathy noted.  No significant neural foraminal canal  narrowing.  Impression:   1. Degenerative changes of the lower lumbar spine as detailed above.  No high-grade central or neural foraminal canal narrowing.  2. Remaining findings as discussed above.         X-ray bilateral hips 05/26/2020  FINDINGS:   3 views of the left hip and AP view of the pelvis.   Left hip prosthesis is present. No evidence of periprosthetic lucency. No acute fracture.           ASSESSMENT: 60 y.o. year old male with     1. Arthropathy of right sacroiliac joint  Ambulatory referral/consult to Pain Clinic      2. Greater trochanteric bursitis, right        3. History of total hip replacement, left  Ambulatory referral/consult to Pain Clinic      4. Right lumbar radiculopathy  pregabalin (LYRICA) 200 MG Cap      5. Sacroiliac joint pain        6. Lumbar muscle pain        7. Lumbosacral pain  Ambulatory referral/consult to Pain Clinic          PLAN:   - Interventions:   - Would recommend: right L5/S1 + S1 TF HEYDI. Patient is not taking prescription blood thinners or ASA.  He adamantly declines, defers treatment.    - S/p Right SIJ + Right GT bursa injection on 2/14/25 with 80% pain relief for less than a week.    - Anticoagulation use: No no anticoagulation    - Medications:  - Restart Lyrica (pregabalin) at 200mg dosage to take BID. We discussed potential side effects of this medication which may include drowsiness,dizziness, dry mouth, constipation or peripheral edema. Consider further increase.      - Refill Celebrex 100-200mg BID PRN to see if this helps with arthritic pain.  Take with food.  Avoid other OTC NSAIDs (ex. Ibuprofen, naproxen) while taking this medication. We have previously discussed potential deleterious side effects of NSAIDs on the cardiovascular, gastrointestinal and renal systems. We have previously discussed judicious use of this medication.       - Refill Zanaflex 4mg BID PRN muscle spasms (or can take 1/2 tablet). Patient understands this may cause drowsiness.    - OTC  Tylenol (acetaminophen) 500mg x 2 tablets (1000mg) TID PRN, not to exceed 3000mg per day.    - LA  reviewed and appropriate.      - Therapy:   We discussed continuing at home physical therapy to help manage the patient/s painful condition. The patient was counseled that muscle strengthening will improve the long term prognosis in regards to pain and may also help increase range of motion and mobility.     Diagnostic/ Imaging: No new imaging ordered. Previous imaging reviewed: MRI lumbar (9/2024), BLE EMG/NCS 10/16/24.     - Records: Previously attempted to obtain old records from outside physicians: Dr. Dimitry Bianchi & Dr. Moe Truner -- nothing received.     - Follow up visit: 3 months follow-up - in clinic (per pt request)     Future Appointments   Date Time Provider Department Center   7/22/2025 10:40 AM Vivian Oscar PA-C [facility]       Visit today included increased complexity associated with the care of the episodic problem of chronic pain which was addressed and continue to manage the longitudinal care of the patient due to the serious and/or complex managed problem(s) listed above.    - Patient Questions: Answered all of the patient's questions regarding diagnosis, therapy, and treatment.    - This condition does not require this patient to take time off of work, and the primary goal of our Pain Management services is to improve the patient's functional capacity.   - I discussed the risks, benefits, and alternatives to potential treatment options. All questions and concerns were fully addressed today in clinic.         Vivian Oscar PA-C  Interventional Pain Management - [facility]    Disclaimer:  This note was prepared using voice recognition system and is likely to have sound alike errors that may have been overlooked even after proof reading.  Please call me with any questions.     This note was generated with the assistance of ambient listening technology. Verbal  consent was obtained by the patient and accompanying visitor(s) for the recording of patient appointment to facilitate this note. I attest to having reviewed and edited the generated note for accuracy, though some syntax or spelling errors may persist. Please contact the author of this note for any clarification.

## 2025-08-13 ENCOUNTER — OFFICE VISIT (OUTPATIENT)
Dept: PAIN MEDICINE | Facility: CLINIC | Age: 60
End: 2025-08-13
Payer: MEDICARE

## 2025-08-13 VITALS
DIASTOLIC BLOOD PRESSURE: 84 MMHG | WEIGHT: 229.06 LBS | RESPIRATION RATE: 17 BRPM | HEART RATE: 73 BPM | HEIGHT: 73 IN | BODY MASS INDEX: 30.36 KG/M2 | SYSTOLIC BLOOD PRESSURE: 128 MMHG

## 2025-08-13 DIAGNOSIS — M53.3 SACROILIAC JOINT PAIN: ICD-10-CM

## 2025-08-13 DIAGNOSIS — M54.16 RIGHT LUMBAR RADICULOPATHY: Primary | ICD-10-CM

## 2025-08-13 DIAGNOSIS — M70.61 GREATER TROCHANTERIC BURSITIS, RIGHT: ICD-10-CM

## 2025-08-13 DIAGNOSIS — Z96.642 HISTORY OF TOTAL HIP REPLACEMENT, LEFT: ICD-10-CM

## 2025-08-13 PROCEDURE — 3074F SYST BP LT 130 MM HG: CPT | Mod: CPTII,S$GLB,, | Performed by: PHYSICIAN ASSISTANT

## 2025-08-13 PROCEDURE — 1159F MED LIST DOCD IN RCRD: CPT | Mod: CPTII,S$GLB,, | Performed by: PHYSICIAN ASSISTANT

## 2025-08-13 PROCEDURE — 3079F DIAST BP 80-89 MM HG: CPT | Mod: CPTII,S$GLB,, | Performed by: PHYSICIAN ASSISTANT

## 2025-08-13 PROCEDURE — 99999 PR PBB SHADOW E&M-EST. PATIENT-LVL IV: CPT | Mod: PBBFAC,,, | Performed by: PHYSICIAN ASSISTANT

## 2025-08-13 PROCEDURE — 1160F RVW MEDS BY RX/DR IN RCRD: CPT | Mod: CPTII,S$GLB,, | Performed by: PHYSICIAN ASSISTANT

## 2025-08-13 PROCEDURE — 99214 OFFICE O/P EST MOD 30 MIN: CPT | Mod: S$GLB,,, | Performed by: PHYSICIAN ASSISTANT

## 2025-08-13 PROCEDURE — 3008F BODY MASS INDEX DOCD: CPT | Mod: CPTII,S$GLB,, | Performed by: PHYSICIAN ASSISTANT
